# Patient Record
Sex: FEMALE | Race: WHITE | NOT HISPANIC OR LATINO
[De-identification: names, ages, dates, MRNs, and addresses within clinical notes are randomized per-mention and may not be internally consistent; named-entity substitution may affect disease eponyms.]

---

## 2020-02-25 PROBLEM — Z00.00 ENCOUNTER FOR PREVENTIVE HEALTH EXAMINATION: Status: ACTIVE | Noted: 2020-02-25

## 2020-03-02 ENCOUNTER — APPOINTMENT (OUTPATIENT)
Dept: GYNECOLOGIC ONCOLOGY | Facility: CLINIC | Age: 20
End: 2020-03-02
Payer: COMMERCIAL

## 2020-03-02 VITALS
OXYGEN SATURATION: 98 % | RESPIRATION RATE: 16 BRPM | WEIGHT: 100 LBS | HEART RATE: 78 BPM | DIASTOLIC BLOOD PRESSURE: 86 MMHG | BODY MASS INDEX: 20.16 KG/M2 | HEIGHT: 59 IN | SYSTOLIC BLOOD PRESSURE: 133 MMHG

## 2020-03-02 DIAGNOSIS — Z78.9 OTHER SPECIFIED HEALTH STATUS: ICD-10-CM

## 2020-03-02 PROCEDURE — 57454 BX/CURETT OF CERVIX W/SCOPE: CPT | Mod: 59

## 2020-03-02 PROCEDURE — 99203 OFFICE O/P NEW LOW 30 MIN: CPT | Mod: 25

## 2020-03-03 PROBLEM — Z78.9 DOES NOT USE TOBACCO: Status: ACTIVE | Noted: 2020-03-03

## 2020-03-03 PROBLEM — Z78.9 NO PERTINENT PAST MEDICAL HISTORY: Status: RESOLVED | Noted: 2020-03-03 | Resolved: 2020-03-03

## 2020-03-03 RX ORDER — NORETHINDRONE AND ETHINYL ESTRADIOL 0.4-0.035
0.4-35 KIT ORAL
Refills: 0 | Status: ACTIVE | COMMUNITY

## 2020-03-03 NOTE — ASSESSMENT
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 \par \par We discussed the etiology of cervical dysplasia and the relationship between the human papilloma virus (HPV) and cervical dysplsia and cancer.  I explained that anogenital transmission of HPV normally follows mucosal-to-mucosal contact.  I also explained what is understood to date with regards to how the HPV virus causes the pathological abnormalities that are seen in cervical dysplasia and cancer.  In addition to HPV, we discussed other risk factors such as smoking, use of oral contraceptives, and induced or acquired immune dysfunction.\par \par Discussed the smoking as a risk factor, and urged patient not to start smoking.\par \par

## 2020-03-03 NOTE — PROCEDURE
[Colposcopy] : colposcopy [HGSIL] : HGSIL [Abnormal Pap] : an abnormal pap smear [Patient] : the patient [HPV high risk] : PCR positive for high risk HPV [Verbal Consent] : verbal consent was obtained prior to the procedure and is detailed in the patient's record [Acetowhite ___ o'clock] : ascetowhite changes at the [unfilled] ~Uo'clock position [Silver Nitrate] : silver nitrate [No Complications] : none [Tolerated Well] : the patient tolerated the procedure well [Punctation ___ o'clock] : no punctation [Biopsies Taken: # ___] : [unfilled] biopsies taken of the cervix [ECC Done] : an Endocervical curettage was performed.

## 2020-03-03 NOTE — PHYSICAL EXAM
[Normal] : Vagina: Normal [Abnormal] : Cervix: Abnormal [de-identified] : See colposcopy note [de-identified] : Patient was seen and examined with chaperone Tanya Kearney PA-C.

## 2020-03-03 NOTE — END OF VISIT
[FreeTextEntry3] : Written by Tanya Kearney PA-C, acting as a scribe for Dr. Atif Montenegro.\par This note accurately reflects the work and decisions made by me.\par

## 2020-03-03 NOTE — CHIEF COMPLAINT
[FreeTextEntry1] : [facility]\par [place] Office\par [address]\[place] [address]\par

## 2020-03-03 NOTE — HISTORY OF PRESENT ILLNESS
[FreeTextEntry1] : 19 yo G0 LMP 2/14/20 referred by Dr. Ch for high grade lesion. Patient Lpap: 2/10/20 with HSIL HPV mRNA detected. GC/CT negative on recent pap. Patient reports she had an abnormal pap last year which resulted as LSIL, HPV but colposcopic biopsies were benign. She reports she has had HPV vaccine but does not know when the last was and how many she had, she reports never smoking. Reports being sexually active with 3 male sexual partners in her lifetime. HIV status unknown.\par

## 2020-03-16 ENCOUNTER — APPOINTMENT (OUTPATIENT)
Dept: GYNECOLOGIC ONCOLOGY | Facility: CLINIC | Age: 20
End: 2020-03-16
Payer: COMMERCIAL

## 2020-03-16 LAB — CORE LAB BIOPSY: NORMAL

## 2020-03-16 PROCEDURE — 99214 OFFICE O/P EST MOD 30 MIN: CPT

## 2020-03-16 NOTE — REASON FOR VISIT
[FreeTextEntry1] : [facility]\par \par [facility] [phone] at [address] [place] [id number]\par

## 2020-03-16 NOTE — PHYSICAL EXAM
[Normal] : No focal neurologic defects observed [de-identified] : Zee Mascorro MA was present the entire time of results and discussion

## 2020-03-16 NOTE — HISTORY OF PRESENT ILLNESS
[FreeTextEntry1] : 19 y/o returns today to review cervical biopsy results. She was referred to my office by Dr. Ch for a high grade HPV + cervical pap smear. She was seen on 03/02/2020 for a consultation. I performed a colposcopy which showed AWE changes at the 11, and 5 o’clock position. Biopsies were taken as well as an ECC. Both the 11 and 5 o'clock cervical positions showed low grade squamous intraepithelial lesions BEV 1. ECC showed fragments of benign endocervical tissue.

## 2020-03-16 NOTE — END OF VISIT
[FreeTextEntry3] : Written by Zee Mascorro, acting as a scribe for Dr. Atif Montenegro.\par This note accurately reflects the work and decisions made by me\par

## 2020-09-18 ENCOUNTER — APPOINTMENT (OUTPATIENT)
Dept: GYNECOLOGIC ONCOLOGY | Facility: CLINIC | Age: 20
End: 2020-09-18
Payer: COMMERCIAL

## 2020-09-18 PROCEDURE — 99214 OFFICE O/P EST MOD 30 MIN: CPT

## 2020-09-21 NOTE — ASSESSMENT
[FreeTextEntry1] : I advised with patient that if her pap smear results from today are abnormal she will be called in to have a colposcopy performed. Otherwise I am recommending patient return to her primary gynecologist in 1 year for a follow up pap smear. Patient stated she understood and agreed to comply.

## 2020-09-21 NOTE — END OF VISIT
[FreeTextEntry3] : Written by Kirsten Cortés, acting as a scribe for Dr. Atif Montenegro \par This note accurately reflects the work and decisions made by me.

## 2020-09-21 NOTE — PHYSICAL EXAM
[Normal] : Bimanual Exam: Normal [de-identified] : Kirsten Cortés Medical assistant chaperoned during gynecologic exam.

## 2020-09-21 NOTE — REASON FOR VISIT
[FreeTextEntry1] : [place] Location \par \par [facility] [place]. [phone]\par [address] [place] [address]

## 2020-09-21 NOTE — HISTORY OF PRESENT ILLNESS
[FreeTextEntry1] : his 20 year old referred to my office by Dr. Ch in March 2020 for a high grade HPV + cervical pap smear. She was seen on 03/02/2020 for a consultation. I performed a colposcopy which showed AWE changes at the 11, and 5 o’clock position. Biopsies were taken as well as an ECC. Both the 11 and 5 o'clock cervical positions showed low grade squamous intraepithelial lesions BEV 1. ECC showed fragments of benign endocervical tissue. Patient returns to the office today for a 6 month follow up cervical pap smear. Patient denies any abnormal VB or pain. Patient states she feels well from a gynecological stand point. \par \par LMP: 8/31/2020

## 2020-09-25 LAB
CYTOLOGY CVX/VAG DOC THIN PREP: ABNORMAL
HPV HIGH+LOW RISK DNA PNL CVX: DETECTED

## 2020-10-02 ENCOUNTER — TRANSCRIPTION ENCOUNTER (OUTPATIENT)
Age: 20
End: 2020-10-02

## 2020-10-16 ENCOUNTER — APPOINTMENT (OUTPATIENT)
Dept: GYNECOLOGIC ONCOLOGY | Facility: CLINIC | Age: 20
End: 2020-10-16
Payer: COMMERCIAL

## 2020-10-16 PROCEDURE — 99214 OFFICE O/P EST MOD 30 MIN: CPT | Mod: 25

## 2020-10-16 PROCEDURE — 57454 BX/CURETT OF CERVIX W/SCOPE: CPT | Mod: 59

## 2020-10-19 NOTE — PHYSICAL EXAM
[Normal] : Bimanual Exam: Normal [de-identified] : Kirsten Cortés Medical assistant chaperoned during gynecologic exam.

## 2020-10-19 NOTE — HISTORY OF PRESENT ILLNESS
[FreeTextEntry1] : This 20 year old was recently seen for a cervical pap smear on 9/18/2020 due to hx of high grade HPV positive cervical pap smears. Patient returns to the office today for a colposcopy due to cervical pap smear from 9/18/2020 revealed AS-CUS and + HPV detection.

## 2020-10-19 NOTE — REASON FOR VISIT
[FreeTextEntry1] : [place] Location \par \par [facility] [place]. [phone]\par [address] [place] [address]

## 2020-10-19 NOTE — PROCEDURE
[Abnormal Pap] : an abnormal pap smear [Colposcopy] : colposcopy [ASCUS] : ASCUS [Patient] : the patient [HPV high risk] : PCR positive for high risk HPV [Verbal Consent] : verbal consent was obtained prior to the procedure and is detailed in the patient's record [Biopsies Taken: # ___] : [unfilled] biopsies taken of the cervix [Biopsy Locations ___ o'clock] : the biopsies were taken at the [unfilled] o'clock position [ECC Done] : an Endocervical curettage was performed.  [Silver Nitrate] : silver nitrate [No Complications] : none [Tolerated Well] : the patient tolerated the procedure well

## 2020-11-04 NOTE — HISTORY OF PRESENT ILLNESS
[FreeTextEntry1] : This 20 year old with hx of high grade and HPV positive was recently seen on 10/16/2020 for a colposcopy due to her pap smear revealing AS-CUS and + HPV. A 9 oclock cervical biopsy and ECC was taken. Patient returns to the office today to review results. 9 o’clock biopsy revealed squamous intraepithelial lesion, mild dysplasia (LGSIL/CIN1), and involving squamous metaplastic mucosa. ECC showed benign endocervical mucosa.

## 2020-11-04 NOTE — PHYSICAL EXAM
[Normal] : No focal neurologic defects observed [de-identified] : Kirsten Cortés Medical assistant chaperoned during results and discussion.

## 2020-11-04 NOTE — REASON FOR VISIT
[FreeTextEntry1] : [place] Location \par \par [facility] [place]. [phone]\par [address] [place] [address]

## 2020-11-06 ENCOUNTER — APPOINTMENT (OUTPATIENT)
Dept: GYNECOLOGIC ONCOLOGY | Facility: CLINIC | Age: 20
End: 2020-11-06
Payer: COMMERCIAL

## 2020-11-06 DIAGNOSIS — B97.7 PAPILLOMAVIRUS AS THE CAUSE OF DISEASES CLASSIFIED ELSEWHERE: ICD-10-CM

## 2020-11-06 LAB — CORE LAB BIOPSY: NORMAL

## 2020-11-06 PROCEDURE — 99072 ADDL SUPL MATRL&STAF TM PHE: CPT

## 2020-11-06 PROCEDURE — 99214 OFFICE O/P EST MOD 30 MIN: CPT

## 2020-11-10 NOTE — ASSESSMENT
[FreeTextEntry1] : I discussed with patient her biopsy results showed a low grade dysplasia which I expect to clear on its own since patient is a non smoker she is low risk. I advised the patient that no surgical intervention is needed at this time and have asked her to return in May for a follow up pap smear. Patient stated she understood and agreed to comply.

## 2020-11-10 NOTE — REASON FOR VISIT
[FreeTextEntry1] : [place] Location \par \par [facility] [place]. [phone]\par [address] [place] [address]

## 2020-11-10 NOTE — HISTORY OF PRESENT ILLNESS
[FreeTextEntry1] : This 20 year old with hx of high grade and HPV positive was recently seen on 10/16/2020 for a colposcopy due to her pap smear revealing AS-CUS and + HPV. A 9 oclock cervical biopsy and ECC was taken. Patient returns to the office today to review results. \par \par 9 o’clock biopsy revealed squamous intraepithelial lesion, mild dysplasia (LGSIL/CIN1), and involving squamous metaplastic mucosa. \par ECC showed benign endocervical mucosa.

## 2020-11-10 NOTE — PHYSICAL EXAM
[Normal] : No focal neurologic defects observed [de-identified] : Kirsten Cortés Medical assistant chaperoned during results and discussion.

## 2021-02-11 ENCOUNTER — NON-APPOINTMENT (OUTPATIENT)
Age: 21
End: 2021-02-11

## 2021-05-03 PROBLEM — R87.613 HIGH GRADE SQUAMOUS INTRAEPITHELIAL LESION OF CERVIX: Status: ACTIVE | Noted: 2020-03-02

## 2021-05-05 ENCOUNTER — LABORATORY RESULT (OUTPATIENT)
Age: 21
End: 2021-05-05

## 2021-05-05 ENCOUNTER — APPOINTMENT (OUTPATIENT)
Dept: GYNECOLOGIC ONCOLOGY | Facility: CLINIC | Age: 21
End: 2021-05-05
Payer: COMMERCIAL

## 2021-05-05 VITALS — SYSTOLIC BLOOD PRESSURE: 104 MMHG | HEART RATE: 64 BPM | DIASTOLIC BLOOD PRESSURE: 72 MMHG | OXYGEN SATURATION: 100 %

## 2021-05-05 DIAGNOSIS — R87.613 HIGH GRADE SQUAMOUS INTRAEPITHELIAL LESION ON CYTOLOGIC SMEAR OF CERVIX (HGSIL): ICD-10-CM

## 2021-05-05 PROCEDURE — 99212 OFFICE O/P EST SF 10 MIN: CPT

## 2021-05-05 PROCEDURE — 99072 ADDL SUPL MATRL&STAF TM PHE: CPT

## 2021-05-05 NOTE — REASON FOR VISIT
[FreeTextEntry1] : [facility]\par \par [facility] [phone] at [address] [place] [phone]\par

## 2021-05-05 NOTE — HISTORY OF PRESENT ILLNESS
[FreeTextEntry1] : 20 y/o female with a hx of HPV and BEV 1 returns today for a 6 month f/u  cervical pap smear. Last pap from 09/2020 showed ASC-US, HPV mRNA +, f/u colposcopy 10/2020 with biopsies from 9 o'clock and ECC were taken. ECC was benign, 9 o'clock position with BEV 1. Pt is not a smoker. She feels well from a gyn stand point. Pt denies post coital bleeding, intermenstrual spotting and pelvic pain.

## 2021-05-05 NOTE — PROCEDURE
[Cervical Pap] : cervical pap smear  [Abnormal Pap Smear] : an abnormal pap smear [Patient] : the patient [Verbal Consent] : verbal consent was obtained prior to the procedure and is detailed in the patient's record

## 2021-05-05 NOTE — PHYSICAL EXAM
[Normal] : Bimanual Exam: Normal [de-identified] : Zee Mascorro MA was present the entire time of gynecological exam.

## 2021-05-14 ENCOUNTER — NON-APPOINTMENT (OUTPATIENT)
Age: 21
End: 2021-05-14

## 2021-05-17 LAB
CYTOLOGY CVX/VAG DOC THIN PREP: ABNORMAL
HPV HIGH+LOW RISK DNA PNL CVX: DETECTED